# Patient Record
(demographics unavailable — no encounter records)

---

## 2024-11-14 NOTE — ASSESSMENT
[FreeTextEntry1] : 52F with PMH of fibroid and HTM, referred to GI office by her heme/onc (Dr. Jarrell at [facility]) for further work-up for GREGORIO. Patient states that she has had GREGORIO since 2018 or so she believes.  H pylori - plan for quadruple therapy, patient provided with written instructions about medications - discusssed reviewing side effects prior to starting, if experiencing significant side effects should stop and call the office, no known med allergies at this time - check HP breath test 4 weeks after finishing quadruple therapy course - patient is traveling until Jan 6 and will follow up after that time  GREGORIO - s/p recent EGD notable for HP and Colonoscopy with ulcerated hemorrhoids - repeat iron studies and bloodwork follow up visit - prior bloodwork [facility], try to obtain records - receives IV iron transfusions with heme periodically, continue f/u with heme  Hemorrhoids - consider referral to colorectal surgery for possible banding of large "ulcerated" hemorrhoids on recent colonoscopy  Colorectal cancer screening - repeat colonoscopy in 7-10 yrs (due 11/2031-11/2033)

## 2024-11-14 NOTE — HISTORY OF PRESENT ILLNESS
[FreeTextEntry1] : 52F with PMH of fibroid and HTM, referred to GI office by her heme/onc (Dr. Jarerll at [place]) for further work-up for GREGORIO, now following up after EGD/colonoscopy with Dr. Armstrong.   EGD notable for H pylori, colonoscopy notable for large ulcerated hemorrhoids. Patient reports feeling better after most recent iron infusion.  EGD findings (11/06/2024):  - normal esophagus  - diffuse ulceration of stomach (biopsies taken for H. pylori)  - normal duodenum (biopsies taken for celiac)   Colonoscopy findings (11/06/2024) 2mm polyp ascending colon Large hemorrhoids with mild ulceration Colonoscopy in 7-10 yrs  Gastric, biopsy:  - Gastric mucosa with moderate chronic inactive gastritis with reactive epithelial changes -  H. Pylori microorganisms seen and highlighted with immunohistochemical stain - Negative for intestinal metaplasia or dysplasia  2. Duodenum, biopsy: - Small intestinal mucosa with preserved villous architecture - No increased intraepithelial lymphocytes or infectious organisms seen  3. Ascending colon polyp, biopsy: - Polypoid fragment of colonic mucosa with no significant pathologic findings   Most recent lab work done by heme onc on 09/17/24: - WBC 4.2, H/H 10/32, plt 434, MCV 86 - iron level 13 and ferritin 11 - CMP from Feb 2024 largely within normal limits  PMH: HTN and fibroids PSH: uterine myomectomy ([place], 2018) Meds: chlorthalidone, IV iron infusions q3-6 months Allergies: NKDA FH: paternal grandmother had colon cancer, otherwise FH of GI cancers SH: denies drug use, smoking, and ETOH use, works as [employer] in [place]  EGD: - estimates was in 2010 - states that it showed no abnormalities - believes that it was done for rectal bleeding and colonoscopy performed at same time  Colonoscopy: - reviewed report on patient's portal - colonoscopy performed on 09/01/2020 - only significant findings was non-bleeding internal hemorrhoids - quality of prep was adequate to identify polyps 6 mm or larger in size - no polyps described but recommended a repeat in 5 years  VCE: none prior CT enterography: none prior

## 2025-01-22 NOTE — END OF VISIT
[FreeTextEntry3] : Patient was seen and discussed with np julianne esparza Note was edited and amended as necessary Patient was physically seen at [address] [Time Spent: ___ minutes] : I have spent [unfilled] minutes of time on the encounter which excludes teaching and separately reported services.

## 2025-01-22 NOTE — HISTORY OF PRESENT ILLNESS
[FreeTextEntry1] : 52F with PMH of fibroid and HTN, referred to GI office by her heme/onc (Dr. Jarrell at [place]) for further work-up for GREGORIO, had EGD/colonoscopy with Dr. Armstrong, now following up after h. pylori treatment.   1/22/25 Pt has no GI complaints today. She does have occasional blood in stool when constipated. She completed h. pylori treatment ~ 3rd week of December. Has been off antacid medication since.   Previous hx:  EGD notable for H pylori, colonoscopy notable for large, ulcerated hemorrhoids. Patient reports feeling better after most recent iron infusion.  EGD findings (11/06/2024):  - normal esophagus  - diffuse ulceration of stomach (biopsies taken for H. pylori)  - normal duodenum (biopsies taken for celiac)   Colonoscopy findings (11/06/2024) 2mm polyp ascending colon Large hemorrhoids with mild ulceration Colonoscopy in 7-10 yrs  Gastric, biopsy:  - Gastric mucosa with moderate chronic inactive gastritis with reactive epithelial changes -  H. Pylori microorganisms seen and highlighted with immunohistochemical stain - Negative for intestinal metaplasia or dysplasia  2. Duodenum, biopsy: - Small intestinal mucosa with preserved villous architecture - No increased intraepithelial lymphocytes or infectious organisms seen  3. Ascending colon polyp, biopsy: - Polypoid fragment of colonic mucosa with no significant pathologic findings   Most recent lab work done by heme onc on 09/17/24: - WBC 4.2, H/H 10/32, plt 434, MCV 86 - iron level 13 and ferritin 11 - CMP from Feb 2024 largely within normal limits  PMH: HTN and fibroids PSH: uterine myomectomy ([place], 2018) Meds: chlorthalidone, IV iron infusions q3-6 months Allergies: NKDA FH: paternal grandmother had colon cancer, otherwise FH of GI cancers SH: denies drug use, smoking, and ETOH use, works as [employer] in [place]  EGD: - estimates was in 2010 - states that it showed no abnormalities - believes that it was done for rectal bleeding and colonoscopy performed at same time  Colonoscopy: - reviewed report on patient's portal - colonoscopy performed on 09/01/2020 - only significant findings was non-bleeding internal hemorrhoids - quality of prep was adequate to identify polyps 6 mm or larger in size - no polyps described but recommended a repeat in 5 years  VCE: none prior CT enterography: none prior

## 2025-01-22 NOTE — ASSESSMENT
[FreeTextEntry1] : 52F with PMH of fibroid and HTN, referred to GI office by her heme/onc (Dr. Jarrell at [place]) for further work-up for GREGORIO, had EGD/colonoscopy with Dr. Armstrong, now following up after h. pylori treatment.    H pylori - completed 2-week antibiotic course in December 2024  - obtain h.pylori eradication testing today  - explained if still positive then would treat with alternative antibiotic therapy   GREGORIO - s/p recent EGD notable for HP and Colonoscopy with ulcerated hemorrhoids - repeat CBC with iron studies today  - receives IV iron transfusions with heme periodically, continue f/u with heme  Hemorrhoids - referral to colorectal surgery for possible banding of large "ulcerated" hemorrhoids on recent colonoscopy  Colorectal cancer screening - repeat colonoscopy in 7-10 yrs (due 11/2031-11/2033)  f/u with h. pylori results and RTC in 6 months

## 2025-02-13 NOTE — HISTORY OF PRESENT ILLNESS
[FreeTextEntry1] : 53 yo F presents for initial evaluation.  Referred by:  Referral Reason: hemorrhoids   PMH: HTN, H pylori s/p treatment, GREGORIO, hemorrhoids PSH: myomectomy  FH: paternal grandmother- colon CA, denies IBD Medications: chlorthalidone  Allergies: NKDA Social history: denies  Last Colonoscopy: 11/2024 -polyp in ascending colon (polypectomy) -internal hemorrhoids with mild ulceration   Patient presents for initial evaluation. She was referred for further evaluation of "mildly ulcerated internal hemorrhoids" as found on colonoscopy in November. She says that she has had hemorrhoids for > 15 years, have never caused her distress other than occasional rectal bleeding. Has trialed steroid suppositories without any notable relief which is why she stopped. Denies rectal pain, bleeding, itching, burning. No prolapsing tissue.  BH:1x/ day, formed/ hard, no fiber supplements or stool softeners  NO AC/ NSAIDS

## 2025-02-13 NOTE — ASSESSMENT
[FreeTextEntry1] : Exam findings and diagnosis were discussed at length with patient.  Recommendations including increased fiber intake, adequate daily hydration, stool softeners as needed, and sitz baths as needed and after bowel movements were discussed. Avoid constipation and diarrhea, avoid pushing/straining. Symptoms have continued despite medical management. Discussed office based treatment and surgery as alternatives. Risks and benefits discussed. She states she is most concerned with the hemorrhoidal bleeding. Recommend rubber band ligation. Patient agreeable and understands multiple treatments may be performed for multiple hemorrhoids. First ligation performed today - left lateral hemorrhoid. F/u 3-4 weeks. All questions answered, patient expressed understanding and is agreeable to this plan.

## 2025-02-13 NOTE — PROCEDURE
[FreeTextEntry1] : Rubber Band Ligation  Pre-procedure Diagnosis: Symptomatic Internal Hemorrhoids Post-procedure Diagnosis: Symptomatic Internal Hemorrhoids Procedure: Anoscopy with Rubber Band Ligation Anesthesia: none Estimated blood loss: minimal Specimen: none Drain: none Complications: none  Indications: Patient presents with history of symptoms related to internal hemorrhoids. On physical exam, internal hemorrhoids were detected. Risks/benefits/alternative were discussed and patient agreed to proceed with office based procedure.  Procedure Details: Consent obtained. Patient was placed in a modified prone cornelio-knife position on the examination table. Digital rectal exam was performed with an index finger with surgical jelly lubricant. An anoscope was inserted into the anal canal. Using the hemorrhoid ligation device, a rubber band was placed around the left lateral hemorrhoid. No active bleeding was noted. The anoscope was removed. The patient tolerated the procedure well.  Post-procedure instructions were reviewed with the patient, including recommendation to notify office immediately for any symptoms of severe pain, bleeding, inability to urinate, fever, or any other concern. All questions answered.

## 2025-02-13 NOTE — PHYSICAL EXAM
[FreeTextEntry1] : Medical assistant present for duration of physical examination   General no acute distress, alert and oriented Psych calm, pleasant demeanor, responding appropriately to questions Nonlabored breathing Ambulating without assistance Skin normal color and pigment, no visible lesions or rashes    Anorectal Exam: Inspection no erythema, induration or fluctuance, no skin excoriation, no fissure, soft residual external hemorrhoids without acute inflammation or thrombosis MUNIR nontender, no masses palpated, no blood on gloved finger   Procedure: Anoscopy   Pre procedure Diagnosis: hemorrhoids Post procedure Diagnosis: hemorrhoids Anesthesia: none Estimated blood loss: none Specimen: none Complications: none   Consent obtained. Anoscopy was performed by passing a lighted anoscope with lubricant jelly into the anal canal and the entire anal mucosal surface was inspected. Findings included no fissure, moderate internal hemorrhoids (LL, RA, RP), no visible masses or lesions in anal canal   Patient tolerated examination and procedure well.

## 2025-03-13 NOTE — PROCEDURE
[FreeTextEntry1] : Rubber Band Ligation  Pre-procedure Diagnosis: Symptomatic Internal Hemorrhoids Post-procedure Diagnosis: Symptomatic Internal Hemorrhoids Procedure: Anoscopy with Rubber Band Ligation Anesthesia: none Estimated blood loss: minimal Specimen: none Drain: none Complications: none  Procedure Details: Consent obtained. Patient was placed in a modified prone cornelio-knife position on the examination table. Digital rectal exam was performed with an index finger with surgical jelly lubricant. An anoscope was inserted into the anal canal. Using the hemorrhoid ligation device, a rubber band was placed around the right posterior hemorrhoid. No active bleeding was noted. The anoscope was removed. The patient tolerated the procedure well.

## 2025-03-13 NOTE — HISTORY OF PRESENT ILLNESS
[FreeTextEntry1] : 53 yo F presents for follow up.  GI Dr Ram  PMH: HTN, H pylori s/p treatment, GREGORIO, hemorrhoids PSH: myomectomy FH: paternal grandmother- colon CA, denies IBD Medications: chlorthalidone Allergies: NKDA Social history: denies  Last Colonoscopy: 11/2024 -polyp in ascending colon (polypectomy) -internal hemorrhoids with mild ulceration  Seen initially 2/13/2025 for internal hemorrhoids found on colonoscopy. She says that she has had hemorrhoids for > 15 years, have never caused her distress other than occasional rectal bleeding. Had tried steroid suppositories without any notable relief which is why she stopped. Exam including anoscopy noted soft residual external hemorrhoids, moderate internal hemorrhoids (LL, RA, RP). s/p rubber band ligation to left lateral hemorrhoid.  Patient presents for follow up. Tolerated last banding well, only took pain medication for 1 day Since banding has noticed some intermittent bleeding with BMs on TP. BMs are soft and not straining.  Patient interested in further banding today.  Denies ASA/NSAIDS in the last 7 days.

## 2025-03-13 NOTE — PHYSICAL EXAM
[FreeTextEntry1] : Medical assistant present for duration of physical examination   General no acute distress, alert and oriented Psych in good spirits, responding appropriately to questions Nonlabored breathing Ambulating without assistance   Anorectal Exam: Inspection no cellulitis or skin changes, no fissure, soft residual external hemorrhoids  MUNIR nontender, no masses palpated, no blood on gloved finger   Procedure: Anoscopy   Pre procedure Diagnosis: hemorrhoids Post procedure Diagnosis: hemorrhoids Anesthesia: none Estimated blood loss: none Specimen: none Complications: none   Consent obtained. Anoscopy was performed by passing a lighted anoscope with lubricant jelly into the anal canal and the entire anal mucosal surface was inspected. Findings included no fissure, well healed post ligation site left lateral, moderate internal hemorrhoids (RA, RP) with bleeding seen from right posterior hemorrhoidal column    Patient tolerated examination and procedure well.    no vomiting/no nausea

## 2025-03-13 NOTE — HISTORY OF PRESENT ILLNESS
[FreeTextEntry1] : 51 yo F presents for follow up.  GI Dr Ram  PMH: HTN, H pylori s/p treatment, GREGORIO, hemorrhoids PSH: myomectomy FH: paternal grandmother- colon CA, denies IBD Medications: chlorthalidone Allergies: NKDA Social history: denies  Last Colonoscopy: 11/2024 -polyp in ascending colon (polypectomy) -internal hemorrhoids with mild ulceration  Seen initially 2/13/2025 for internal hemorrhoids found on colonoscopy. She says that she has had hemorrhoids for > 15 years, have never caused her distress other than occasional rectal bleeding. Had tried steroid suppositories without any notable relief which is why she stopped. Exam including anoscopy noted soft residual external hemorrhoids, moderate internal hemorrhoids (LL, RA, RP). s/p rubber band ligation to left lateral hemorrhoid.  Patient presents for follow up. Tolerated last banding well, only took pain medication for 1 day Since banding has noticed some intermittent bleeding with BMs on TP. BMs are soft and not straining.  Patient interested in further banding today.  Denies ASA/NSAIDS in the last 7 days.

## 2025-03-13 NOTE — ASSESSMENT
[FreeTextEntry1] : Exam findings were discussed at length with patient. Hemorrhoid symptoms improving but not fully resolved since initiation of rubber banding 2nd treatment performed today - right posterior hemorrhoid Post-procedure instructions were reviewed with the patient F/u 3-4 weeks All questions were answereds

## 2025-03-13 NOTE — PHYSICAL EXAM
[FreeTextEntry1] : Medical assistant present for duration of physical examination   General no acute distress, alert and oriented Psych in good spirits, responding appropriately to questions Nonlabored breathing Ambulating without assistance   Anorectal Exam: Inspection no cellulitis or skin changes, no fissure, soft residual external hemorrhoids  MUNIR nontender, no masses palpated, no blood on gloved finger   Procedure: Anoscopy   Pre procedure Diagnosis: hemorrhoids Post procedure Diagnosis: hemorrhoids Anesthesia: none Estimated blood loss: none Specimen: none Complications: none   Consent obtained. Anoscopy was performed by passing a lighted anoscope with lubricant jelly into the anal canal and the entire anal mucosal surface was inspected. Findings included no fissure, well healed post ligation site left lateral, moderate internal hemorrhoids (RA, RP) with bleeding seen from right posterior hemorrhoidal column    Patient tolerated examination and procedure well.

## 2025-05-08 NOTE — HISTORY OF PRESENT ILLNESS
[FreeTextEntry1] : 51 y/o F presents for follow up   GI Dr Ram  PMH: HTN, H pylori s/p treatment, GREGORIO, hemorrhoids PSH: myomectomy FH: paternal grandmother- colon CA, denies IBD Medications: chlorthalidone Allergies: NKDA Social history: denies  Last Colonoscopy: 11/2024 -polyp in ascending colon (polypectomy) -internal hemorrhoids with mild ulceration  Seen initially 2/13/2025 for internal hemorrhoids found on colonoscopy. She reported that she has had hemorrhoids for > 15 years, but never caused her distress other than occasional rectal bleeding. Had tried steroid suppositories without any notable relief which is why she stopped. Exam including anoscopy noted soft residual external hemorrhoids, moderate internal hemorrhoids (LL, RA, RP). s/p rubber band ligation to left lateral hemorrhoid.  Last seen 03/13/25 for follow up  Exam including anoscopy noted soft residual external hemorrhoids, well healed post ligation site left lateral, moderate internal hemorrhoids (RA, RP) with bleeding seen from right posterior hemorrhoidal column s/p rubber band ligation of Right Posterior Hemorrhoid  Patient presents today in follow up. Tolerated second ligation treatment, but reports more pain after that treatment compared to the first banding.  Overall she feels improvement in her symptoms. She is no longer having pain. She states she has seen BRB on TP with bowel movements 3 times since last visit, which is less than prior to starting treatments. She is interested in further banding today.  Denies ASA or anticoagulation.

## 2025-05-08 NOTE — PROCEDURE
[FreeTextEntry1] : Rubber Band Ligation  Pre-procedure Diagnosis: Symptomatic Internal Hemorrhoids Post-procedure Diagnosis: Symptomatic Internal Hemorrhoids Procedure: Anoscopy with Rubber Band Ligation Anesthesia: none Estimated blood loss: minimal Specimen: none Drain: none Complications: none  Procedure Details: Consent obtained. Patient was placed in a modified prone cornelio-knife position on the examination table. Digital rectal exam was performed with an index finger with surgical jelly lubricant. An anoscope was inserted into the anal canal. Using the hemorrhoid ligation device, a rubber band was placed around the right anterior hemorrhoid. No active bleeding was noted. The anoscope was removed. The patient tolerated the procedure well.

## 2025-05-08 NOTE — PHYSICAL EXAM
[FreeTextEntry1] : Medical assistant present for duration of physical examination  General no acute distress, alert and oriented Psych in good spirits, responding appropriately to questions Nonlabored breathing Ambulating without assistance  Anorectal Exam: Inspection no cellulitis or skin changes, no fissure, soft residual external hemorrhoids MUNIR nontender, no masses palpated, no blood on gloved finger  Procedure: Anoscopy  Pre procedure Diagnosis: hemorrhoids Post procedure Diagnosis: hemorrhoids Anesthesia: none Estimated blood loss: none Specimen: none Complications: none  Consent obtained. Anoscopy was performed by passing a lighted anoscope with lubricant jelly into the anal canal and the entire anal mucosal surface was inspected. Findings included no fissure, well healed post ligation sites left lateral and right posterior, moderate internal hemorrhoid with inflammation right anterior  Patient tolerated examination and procedure well.

## 2025-05-08 NOTE — ASSESSMENT
[FreeTextEntry1] : 3rd treatment performed today - right anterior hemorrhoid ligation. Post-procedure instructions were reviewed with the patient F/u 4-6 weeks All questions were answered.

## 2025-06-10 NOTE — PHYSICAL EXAM
[FreeTextEntry1] : Medical assistant present for duration of physical examination  General no acute distress, alert and oriented Psych in good spirits, responding appropriately to questions Nonlabored breathing Ambulating without assistance  Anorectal Exam: Inspection no cellulitis or skin changes, no fissure, soft residual external hemorrhoids MUNIR nontender, no masses palpated, no blood on gloved finger  Procedure: Anoscopy  Pre procedure Diagnosis: hemorrhoids Post procedure Diagnosis: hemorrhoids Anesthesia: none Estimated blood loss: none Specimen: none Complications: none  Consent obtained. Anoscopy was performed by passing a lighted anoscope with lubricant jelly into the anal canal and the entire anal mucosal surface was inspected. Findings included no fissure, well healed post ligation sites, no residual hemorrhoidal inflammation  Patient tolerated examination and procedure well.

## 2025-06-10 NOTE — ASSESSMENT
[FreeTextEntry1] : Continue supportive care. High fiber diet, adequate water intake Avoid pushing/straining F/u if symptoms return All questions answered